# Patient Record
Sex: FEMALE | Race: WHITE | NOT HISPANIC OR LATINO | Employment: FULL TIME | URBAN - METROPOLITAN AREA
[De-identification: names, ages, dates, MRNs, and addresses within clinical notes are randomized per-mention and may not be internally consistent; named-entity substitution may affect disease eponyms.]

---

## 2019-10-18 ENCOUNTER — OFFICE VISIT (OUTPATIENT)
Dept: FAMILY MEDICINE CLINIC | Facility: CLINIC | Age: 64
End: 2019-10-18

## 2019-10-18 VITALS
HEART RATE: 90 BPM | DIASTOLIC BLOOD PRESSURE: 70 MMHG | TEMPERATURE: 97.8 F | BODY MASS INDEX: 21.52 KG/M2 | SYSTOLIC BLOOD PRESSURE: 120 MMHG | OXYGEN SATURATION: 98 % | RESPIRATION RATE: 16 BRPM | HEIGHT: 61 IN | WEIGHT: 114 LBS

## 2019-10-18 DIAGNOSIS — H61.23 BILATERAL IMPACTED CERUMEN: ICD-10-CM

## 2019-10-18 DIAGNOSIS — Z12.39 BREAST CANCER SCREENING: ICD-10-CM

## 2019-10-18 DIAGNOSIS — Z11.59 ENCOUNTER FOR HEPATITIS C SCREENING TEST FOR LOW RISK PATIENT: ICD-10-CM

## 2019-10-18 DIAGNOSIS — E78.2 MIXED HYPERLIPIDEMIA: ICD-10-CM

## 2019-10-18 DIAGNOSIS — Z12.11 SCREENING FOR COLON CANCER: ICD-10-CM

## 2019-10-18 DIAGNOSIS — Z00.00 ANNUAL PHYSICAL EXAM: Primary | ICD-10-CM

## 2019-10-18 DIAGNOSIS — E55.9 VITAMIN D DEFICIENCY: ICD-10-CM

## 2019-10-18 DIAGNOSIS — R42 DIZZINESS: ICD-10-CM

## 2019-10-18 DIAGNOSIS — I73.00 RAYNAUD'S DISEASE WITHOUT GANGRENE: ICD-10-CM

## 2019-10-18 DIAGNOSIS — Z23 IMMUNIZATION DUE: ICD-10-CM

## 2019-10-18 PROCEDURE — 90471 IMMUNIZATION ADMIN: CPT | Performed by: NURSE PRACTITIONER

## 2019-10-18 PROCEDURE — 99397 PER PM REEVAL EST PAT 65+ YR: CPT | Performed by: NURSE PRACTITIONER

## 2019-10-18 PROCEDURE — 90715 TDAP VACCINE 7 YRS/> IM: CPT | Performed by: NURSE PRACTITIONER

## 2019-10-18 RX ORDER — MECLIZINE HYDROCHLORIDE 25 MG/1
12.5 TABLET ORAL 3 TIMES DAILY PRN
Qty: 30 TABLET | Refills: 0 | Status: SHIPPED | OUTPATIENT
Start: 2019-10-18 | End: 2022-05-31

## 2019-10-18 RX ORDER — FLUTICASONE PROPIONATE 50 MCG
2 SPRAY, SUSPENSION (ML) NASAL DAILY
COMMUNITY
Start: 2019-10-18 | End: 2022-05-31

## 2019-10-18 RX ORDER — AMLODIPINE BESYLATE 5 MG/1
5 TABLET ORAL DAILY
Qty: 30 TABLET | Refills: 2 | Status: SHIPPED | OUTPATIENT
Start: 2019-10-18 | End: 2022-05-31

## 2019-10-18 NOTE — PROGRESS NOTES
Patient ID: Danii Emanuel is a 64 y.o. female     Subjective     Chief Complaint   Patient presents with   • Establish Care   • Dizziness       History of Present Illness    Danii Emanuel presents to the office today for new patient evaluation for annual physical exam.  It has been a while since she has been seen by primary care provider.  She was previously seen by Dr. Jaun Garcia.  She has been doing well except for new onset of dizziness which started approximately 2 weeks ago.  Occurs with change of position.  Especially when laying down.  Also dizziness worsens when sitting up.   She has not been taking any medications on a regular basis.  She does have history of Raynaud's disease and previously was taking amlodipine 5 mg daily which helped control her symptoms.  She stopped taking a couple years ago due to not being seen by primary care.  Continues to have coldness in her hands bilaterally.  She has difficulty grasping objects at times when her fingers are cold.  Has decreased feeling in fingers during those times.  She also had previously taken a baby aspirin which she has continue taking on a daily basis and this helps with her Raynaud's also.  She is a current everyday smoker of approximately half a pack a day for the past 50 years.  She has no desire to quit smoking at this time.  Prior history of hypertension in which she has not been taking any medications at this time.  She also had low vitamin D levels in the past.  She has not had colonoscopy completed yet.  She is also past due for mammogram.  Most recent completed in June 2013 which was negative.    Most recent DEXA scan completed and June 2007 that revealed lumbar spine T score of -0.9, left hip T score -0.8.  Normal study.  She wishes to hold off on repeating DEXA scan at this time.  She underwent Doppler ultrasound of carotid arteries in May 1998 which was normal.  She had previously been started on treatment for hyperlipidemia and 2009  however no longer takes medication for this.  She is fasting today.  She is past due for Pap.  Previous one completed years ago was normal.    She denies any complaints of fever, chills, cough, chest pain, shortness of air, abdominal pain, nausea, or any other concerns.     The following portions of the patient's history were reviewed and updated as appropriate: allergies, current medications, past family history, past medical history, past social history, past surgical history and problem list.       Review of Systems   Constitution: Negative.   HENT: Negative.    Eyes: Negative.    Cardiovascular: Negative.    Respiratory: Negative.    Endocrine: Negative.    Hematologic/Lymphatic: Negative.    Skin: Negative.    Musculoskeletal: Negative.    Gastrointestinal: Negative.    Genitourinary: Negative.    Neurological: Positive for dizziness.   Psychiatric/Behavioral: Negative.        Vitals:    10/18/19 0825   BP: 120/70   Pulse: 90   Resp: 16   Temp: 97.8 °F (36.6 °C)   SpO2: 98%       Documented weights    10/18/19 0825   Weight: 51.7 kg (114 lb)     Body mass index is 21.54 kg/m².    Results for orders placed or performed in visit on 06/28/16    DEXA SCAN   Result Value Ref Range     Dexa Scan       Ordering MD - Dr. Jaun Garcia - [facility] - Normal bone density.       Objective       Physical Exam   Constitutional: She is oriented to person, place, and time. Vital signs are normal. She appears well-developed.   HENT:   Head: Normocephalic and atraumatic.   Mouth/Throat: Oropharynx is clear and moist.   Bilateral cerumen impaction    Eyes: EOM are normal. Pupils are equal, round, and reactive to light.   Neck: Normal range of motion. Neck supple.   Cardiovascular: Normal rate, regular rhythm, normal heart sounds and intact distal pulses.   No murmur heard.  Pulmonary/Chest: Effort normal and breath sounds normal. She has no wheezes. She has no rhonchi. She has no rales.   Abdominal: Soft. Bowel sounds are  normal. There is no hepatosplenomegaly. There is no tenderness.   Musculoskeletal: Normal range of motion. She exhibits no edema or tenderness.   Neurological: She is alert and oriented to person, place, and time. She has normal strength.   Noted dizziness with laying down and sitting up.  No nystagmus noted.     Skin: Skin is warm and dry. No rash noted. No cyanosis or erythema.   Psychiatric: She has a normal mood and affect. Her behavior is normal.     Ear Cerumen Removal  Date/Time: 10/21/2019 12:08 PM  Performed by: Sharlene Yu APRN  Authorized by: Sharlene Yu APRN   Consent: Verbal consent obtained.  Risks and benefits: risks, benefits and alternatives were discussed  Consent given by: patient  Patient understanding: patient states understanding of the procedure being performed  Patient identity confirmed: verbally with patient    Anesthesia:  Local Anesthetic: none  Location details: left ear and right ear  Patient tolerance: Patient tolerated the procedure well with no immediate complications  Comments: Moderate amount removed from both ears.  Able to visualize TM which was bulging right ear.  No redness.    Procedure type: irrigation   Sedation:  Patient sedated: no           Assessment/Plan     Assessment/Plan     Danii was seen today for establish care and dizziness.    Diagnoses and all orders for this visit:    Annual physical exam  -     CBC & Differential  -     Comprehensive Metabolic Panel  -     Lipid Panel  -     TSH  -     Vitamin D 25 Hydroxy    Raynaud's disease without gangrene  -     amLODIPine (NORVASC) 5 MG tablet; Take 1 tablet by mouth Daily.  -     CBC & Differential  -     Comprehensive Metabolic Panel    Vitamin D deficiency  -     Vitamin D 25 Hydroxy    Mixed hyperlipidemia  -     Comprehensive Metabolic Panel  -     Lipid Panel    Encounter for hepatitis C screening test for low risk patient  -     Hepatitis C Antibody    Breast cancer screening  -     Mammo Screening Bilateral  With CAD; Future    Screening for colon cancer  -     Ambulatory Referral For Screening Colonoscopy    Immunization due  -     Tdap Vaccine Greater Than or Equal To 6yo IM    Bilateral impacted cerumen  -     Ear Cerumen Removal    Dizziness    Other orders  -     fluticasone (FLONASE) 50 MCG/ACT nasal spray; 2 sprays into the nostril(s) as directed by provider Daily.  -     meclizine (ANTIVERT) 25 MG tablet; Take 0.5 tablets by mouth 3 (Three) Times a Day As Needed for dizziness.      Summary:  Danii Emanuel presents office today as a new patient evaluation for annual physical exam.  It has been many years since she was last seen.  She primarily presented to office today due to 2-week course of dizziness.  She did have cerumen impaction and required ear irrigation.  Her symptoms of dizziness did not improve after this procedure.  Instructed to use Flonase nasal spray 2 sprays each nostril daily.  Also recommended physical therapy for Epley maneuvers however she does not wish to proceed with this at this time.  She wishes to do exercises on her own at home.  I provided her written information concerning the Epley maneuvers to be completed at home and instructed to attempt to have completed at least 3 times a day.  If her symptoms do not improve, believe the next step would be physical therapy.  She is fasting today, we will obtain routine labs today and call her with results.  Also instructed to resume taking amlodipine as previously directed to 5 mg daily.  Continue enteric-coated baby aspirin daily.  Also gave her prescription to Antivert to take as needed for dizziness.  Depending on lab results will also determine further recommendations.  I also offered to have Pap completed in office today however she wishes to hold off for now.  She plans to schedule when she is able to come in.  Ordered mammogram and referral for screening colonoscopy.    In the meantime, instructed to contact us sooner for any problems or  concerns.    Sharlene LEWIS Head, APRN  Family Medicine  [facility] [place]  10/18/19  8:40 AM

## 2019-10-19 LAB
25(OH)D3+25(OH)D2 SERPL-MCNC: 25.4 NG/ML (ref 30–100)
ALBUMIN SERPL-MCNC: 4.7 G/DL (ref 3.5–5.2)
ALBUMIN/GLOB SERPL: 1.9 G/DL
ALP SERPL-CCNC: 41 U/L (ref 39–117)
ALT SERPL-CCNC: 11 U/L (ref 1–33)
AST SERPL-CCNC: 16 U/L (ref 1–32)
BASOPHILS # BLD AUTO: 0.03 10*3/MM3 (ref 0–0.2)
BASOPHILS NFR BLD AUTO: 0.3 % (ref 0–1.5)
BILIRUB SERPL-MCNC: 0.4 MG/DL (ref 0.2–1.2)
BUN SERPL-MCNC: 8 MG/DL (ref 8–23)
BUN/CREAT SERPL: 12.5 (ref 7–25)
CALCIUM SERPL-MCNC: 10.1 MG/DL (ref 8.6–10.5)
CHLORIDE SERPL-SCNC: 101 MMOL/L (ref 98–107)
CHOLEST SERPL-MCNC: 245 MG/DL (ref 0–200)
CO2 SERPL-SCNC: 27.7 MMOL/L (ref 22–29)
CREAT SERPL-MCNC: 0.64 MG/DL (ref 0.57–1)
EOSINOPHIL # BLD AUTO: 0.08 10*3/MM3 (ref 0–0.4)
EOSINOPHIL NFR BLD AUTO: 0.7 % (ref 0.3–6.2)
ERYTHROCYTE [DISTWIDTH] IN BLOOD BY AUTOMATED COUNT: 12.2 % (ref 12.3–15.4)
GLOBULIN SER CALC-MCNC: 2.5 GM/DL
GLUCOSE SERPL-MCNC: 86 MG/DL (ref 65–99)
HCT VFR BLD AUTO: 41.9 % (ref 34–46.6)
HCV AB S/CO SERPL IA: 0.1 S/CO RATIO (ref 0–0.9)
HDLC SERPL-MCNC: 78 MG/DL (ref 40–60)
HGB BLD-MCNC: 14 G/DL (ref 12–15.9)
IMM GRANULOCYTES # BLD AUTO: 0.04 10*3/MM3 (ref 0–0.05)
IMM GRANULOCYTES NFR BLD AUTO: 0.3 % (ref 0–0.5)
LDLC SERPL CALC-MCNC: 147 MG/DL (ref 0–100)
LYMPHOCYTES # BLD AUTO: 2.2 10*3/MM3 (ref 0.7–3.1)
LYMPHOCYTES NFR BLD AUTO: 19.1 % (ref 19.6–45.3)
MCH RBC QN AUTO: 32.3 PG (ref 26.6–33)
MCHC RBC AUTO-ENTMCNC: 33.4 G/DL (ref 31.5–35.7)
MCV RBC AUTO: 96.5 FL (ref 79–97)
MONOCYTES # BLD AUTO: 0.88 10*3/MM3 (ref 0.1–0.9)
MONOCYTES NFR BLD AUTO: 7.6 % (ref 5–12)
NEUTROPHILS # BLD AUTO: 8.28 10*3/MM3 (ref 1.7–7)
NEUTROPHILS NFR BLD AUTO: 72 % (ref 42.7–76)
NRBC BLD AUTO-RTO: 0 /100 WBC (ref 0–0.2)
PLATELET # BLD AUTO: 380 10*3/MM3 (ref 140–450)
POTASSIUM SERPL-SCNC: 4.4 MMOL/L (ref 3.5–5.2)
PROT SERPL-MCNC: 7.2 G/DL (ref 6–8.5)
RBC # BLD AUTO: 4.34 10*6/MM3 (ref 3.77–5.28)
SODIUM SERPL-SCNC: 141 MMOL/L (ref 136–145)
TRIGL SERPL-MCNC: 101 MG/DL (ref 0–150)
TSH SERPL DL<=0.005 MIU/L-ACNC: 0.72 UIU/ML (ref 0.27–4.2)
VLDLC SERPL CALC-MCNC: 20.2 MG/DL
WBC # BLD AUTO: 11.51 10*3/MM3 (ref 3.4–10.8)

## 2019-10-21 PROCEDURE — 69209 REMOVE IMPACTED EAR WAX UNI: CPT | Performed by: NURSE PRACTITIONER

## 2019-10-22 ENCOUNTER — TELEPHONE (OUTPATIENT)
Dept: FAMILY MEDICINE CLINIC | Facility: CLINIC | Age: 64
End: 2019-10-22

## 2019-10-22 NOTE — TELEPHONE ENCOUNTER
Pt would like to continue the exercises Sharlene gave at her appt, as her  is having surgery guillermo, and she would rather not have multiple appts for herself and her .  Instructed pt to continue the exercises, and notify us in 2-3 weeks if there is no improvement, or sooner if she would like a referral.

## 2019-10-22 NOTE — TELEPHONE ENCOUNTER
Upon contacting pt about her lab results, she mentioned she is having trouble with vertigo.     Pt stated she had a bilat ear lavage when seen last week, as Sharlene thought this would resolve her dizziness, but it didn't help.     Pt would like to know what else she can do for this?  Please advise.

## 2019-11-19 DIAGNOSIS — Z11.1 SCREENING-PULMONARY TB: Primary | ICD-10-CM

## 2019-11-24 ENCOUNTER — RESULTS ENCOUNTER (OUTPATIENT)
Dept: FAMILY MEDICINE CLINIC | Facility: CLINIC | Age: 64
End: 2019-11-24

## 2019-11-24 DIAGNOSIS — Z11.1 SCREENING-PULMONARY TB: ICD-10-CM

## 2019-11-25 ENCOUNTER — APPOINTMENT (OUTPATIENT)
Dept: LAB | Facility: HOSPITAL | Age: 64
End: 2019-11-25

## 2019-11-25 PROCEDURE — 36415 COLL VENOUS BLD VENIPUNCTURE: CPT | Performed by: NURSE PRACTITIONER

## 2019-11-25 PROCEDURE — 86480 TB TEST CELL IMMUN MEASURE: CPT | Performed by: NURSE PRACTITIONER

## 2019-11-27 LAB
QUANTIFERON CRITERIA: NORMAL
QUANTIFERON INCUBATION: NORMAL
QUANTIFERON MITOGEN VALUE: >10 IU/ML
QUANTIFERON NIL VALUE: 0.01 IU/ML
QUANTIFERON TB1 AG VALUE: 0.02 IU/ML
QUANTIFERON TB2 AG VALUE: 0.01 IU/ML
QUANTIFERON-TB GOLD PLUS: NEGATIVE

## 2021-02-19 ENCOUNTER — IMMUNIZATION (OUTPATIENT)
Dept: VACCINE CLINIC | Facility: HOSPITAL | Age: 66
End: 2021-02-19

## 2021-02-19 PROCEDURE — 0001A: CPT | Performed by: INTERNAL MEDICINE

## 2021-02-19 PROCEDURE — 91300 HC SARSCOV02 VAC 30MCG/0.3ML IM: CPT | Performed by: INTERNAL MEDICINE

## 2021-03-12 ENCOUNTER — IMMUNIZATION (OUTPATIENT)
Dept: VACCINE CLINIC | Facility: HOSPITAL | Age: 66
End: 2021-03-12

## 2021-03-12 PROCEDURE — 0002A: CPT | Performed by: INTERNAL MEDICINE

## 2021-03-12 PROCEDURE — 91300 HC SARSCOV02 VAC 30MCG/0.3ML IM: CPT | Performed by: INTERNAL MEDICINE

## 2021-04-12 ENCOUNTER — OFFICE VISIT (OUTPATIENT)
Dept: FAMILY MEDICINE CLINIC | Facility: CLINIC | Age: 66
End: 2021-04-12

## 2021-04-12 VITALS
HEIGHT: 61 IN | WEIGHT: 108 LBS | TEMPERATURE: 97.1 F | RESPIRATION RATE: 16 BRPM | OXYGEN SATURATION: 99 % | HEART RATE: 97 BPM | DIASTOLIC BLOOD PRESSURE: 80 MMHG | BODY MASS INDEX: 20.39 KG/M2 | SYSTOLIC BLOOD PRESSURE: 120 MMHG

## 2021-04-12 DIAGNOSIS — H61.23 BILATERAL IMPACTED CERUMEN: Primary | ICD-10-CM

## 2021-04-12 DIAGNOSIS — R21 RASH: ICD-10-CM

## 2021-04-12 DIAGNOSIS — R42 DIZZINESS: ICD-10-CM

## 2021-04-12 PROCEDURE — 69209 REMOVE IMPACTED EAR WAX UNI: CPT | Performed by: NURSE PRACTITIONER

## 2021-04-12 PROCEDURE — 99213 OFFICE O/P EST LOW 20 MIN: CPT | Performed by: NURSE PRACTITIONER

## 2021-04-12 NOTE — PATIENT INSTRUCTIONS
Dizziness  Dizziness is a common problem. It makes you feel unsteady or light-headed. You may feel like you are about to pass out (faint). Dizziness can lead to getting hurt if you stumble or fall. Dizziness can be caused by many things, including:  · Medicines.  · Not having enough water in your body (dehydration).  · Illness.  Follow these instructions at home:  Eating and drinking    · Drink enough fluid to keep your pee (urine) clear or pale yellow. This helps to keep you from getting dehydrated. Try to drink more clear fluids, such as water.  · Do not drink alcohol.  · Limit how much caffeine you drink or eat, if your doctor tells you to do that.  · Limit how much salt (sodium) you drink or eat, if your doctor tells you to do that.  Activity    · Avoid making quick movements.  ? When you stand up from sitting in a chair, steady yourself until you feel okay.  ? In the morning, first sit up on the side of the bed. When you feel okay, stand slowly while you hold onto something. Do this until you know that your balance is fine.  · If you need to  one place for a long time, move your legs often. Tighten and relax the muscles in your legs while you are standing.  · Do not drive or use heavy machinery if you feel dizzy.  · Avoid bending down if you feel dizzy. Place items in your home so you can reach them easily without leaning over.  Lifestyle  · Do not use any products that contain nicotine or tobacco, such as cigarettes and e-cigarettes. If you need help quitting, ask your doctor.  · Try to lower your stress level. You can do this by using methods such as yoga or meditation. Talk with your doctor if you need help.  General instructions  · Watch your dizziness for any changes.  · Take over-the-counter and prescription medicines only as told by your doctor. Talk with your doctor if you think that you are dizzy because of a medicine that you are taking.  · Tell a friend or a family member that you are feeling  dizzy. If he or she notices any changes in your behavior, have this person call your doctor.  · Keep all follow-up visits as told by your doctor. This is important.  Contact a doctor if:  · Your dizziness does not go away.  · Your dizziness or light-headedness gets worse.  · You feel sick to your stomach (nauseous).  · You have trouble hearing.  · You have new symptoms.  · You are unsteady on your feet.  · You feel like the room is spinning.  Get help right away if:  · You throw up (vomit) or have watery poop (diarrhea), and you cannot eat or drink anything.  · You have trouble:  ? Talking.  ? Walking.  ? Swallowing.  ? Using your arms, hands, or legs.  · You feel generally weak.  · You are not thinking clearly, or you have trouble forming sentences. A friend or family member may notice this.  · You have:  ? Chest pain.  ? Pain in your belly (abdomen).  ? Shortness of breath.  ? Sweating.  · Your vision changes.  · You are bleeding.  · You have a very bad headache.  · You have neck pain or a stiff neck.  · You have a fever.  These symptoms may be an emergency. Do not wait to see if the symptoms will go away. Get medical help right away. Call your local emergency services (911 in the [place] Do not drive yourself to the hospital.  Summary  · Dizziness makes you feel unsteady or light-headed. You may feel like you are about to pass out (faint).  · Drink enough fluid to keep your pee (urine) clear or pale yellow. Do not drink alcohol.  · Avoid making quick movements if you feel dizzy.  · Watch your dizziness for any changes.  This information is not intended to replace advice given to you by your health care provider. Make sure you discuss any questions you have with your health care provider.  Document Revised: 12/21/2018 Document Reviewed: 01/04/2018  Elsevier Patient Education © 2021 [employer]

## 2021-04-12 NOTE — PROGRESS NOTES
Patient ID: Danii Emanuel is a 65 y.o. female     Patient Care Team:  Head, PEGGY Correa as PCP - General (Nurse Practitioner)    Subjective     Chief Complaint   Patient presents with   • Dizziness       Danii Emanuel presents to [facility] today for problems with dizziness.  Onset over the past week or so.  Had previous symptoms which were related to impacted cerumen in bilateral ears.  Current symptoms feel similar to previous.  However left ear is worse than the right..     Dizziness  This is a new problem. The current episode started 1 to 4 weeks ago. The problem occurs daily. The problem has been gradually worsening. Pertinent negatives include no congestion, coughing, fatigue, fever or sore throat.       She denies any complaints of fever, chills, cough, chest pain, shortness of air, abdominal pain, nausea, or any other concerns.     The following portions of the patient's history were reviewed and updated as appropriate: allergies, current medications, past family history, past medical history, past social history, past surgical history and problem list.       Review of Systems   Constitutional: Negative for fatigue and fever.   HENT: Negative for congestion and sore throat.    Respiratory: Negative for cough.    Neurological: Positive for dizziness.       Vitals:    04/12/21 0732   BP: 120/80   Pulse: 97   Resp: 16   Temp: 97.1 °F (36.2 °C)   SpO2: 99%       Documented weights    04/12/21 0732   Weight: 49 kg (108 lb)     Body mass index is 20.41 kg/m².    Results for orders placed or performed in visit on 11/24/19   QuantiFERON TB Gold    Specimen: Blood   Result Value Ref Range    QuantiFERON Incubation Incubation performed.     QuantiFERON Criteria Comment     QUANTIFERON TB1 AG VALUE 0.02 IU/mL    QUANTIFERON TB2 AG VALUE 0.01 IU/mL    QuantiFERON Nil Value 0.01 IU/mL    QuantiFERON Mitogen Value >10.00 IU/mL    QUANTIFERON-TB GOLD PLUS Negative Negative            Objective       Physical Exam  Vitals reviewed.   Constitutional:       General: She is not in acute distress.  HENT:      Head: Normocephalic and atraumatic.      Right Ear: There is impacted cerumen.      Left Ear: There is impacted cerumen.   Eyes:      Extraocular Movements: Extraocular movements intact.      Pupils: Pupils are equal, round, and reactive to light.   Cardiovascular:      Rate and Rhythm: Normal rate and regular rhythm.      Heart sounds: No murmur heard.     Pulmonary:      Effort: Pulmonary effort is normal.      Breath sounds: Normal breath sounds. No wheezing.   Musculoskeletal:      Cervical back: Normal range of motion.      Right lower leg: No edema.      Left lower leg: No edema.   Neurological:      Mental Status: She is alert and oriented to person, place, and time.      Cranial Nerves: No cranial nerve deficit.   Psychiatric:         Mood and Affect: Mood normal.       Ear Cerumen Removal    Date/Time: 4/12/2021 6:40 PM  Performed by: Sharlene Yu APRN  Authorized by: Sharlene Yu APRN     Anesthesia:  Local Anesthetic: none  Location details: left ear and right ear  Patient tolerance: patient tolerated the procedure well with no immediate complications  Comments: Moderate amount irrigated from left and small amount from right.  TM visualized bilaterally and are normal,    Procedure type: irrigation   Sedation:  Patient sedated: no             Assessment/Plan     Assessment/Plan     Diagnoses and all orders for this visit:    1. Bilateral impacted cerumen (Primary)  -     Ear Cerumen Removal    2. Rash  -     triamcinolone (KENALOG) 0.1 % ointment; Apply  topically to the appropriate area as directed 2 (Two) Times a Day.  Dispense: 15 g; Refill: 0    3. Dizziness          Summary:  Danii Emanuel presented with problems with dizziness.  Likely her symptoms related to earwax buildup.  Ears were irrigated in office today.  Instructed to let us know if her symptoms continue or do  not continue to improve.  Also strongly advised for her to schedule for annual physical exam with fasting labs.  She also complained of a rash area to right forearm that has been there about a week.  Complains of itching at site.  Has been using over-the-counter hydrocortisone without relief.  Instructed to apply treatments along ointment to area twice a day until healed.  She is to let us know if does not improve with these measures.    In the meantime, instructed to contact us sooner for any problems or concerns.    Follow Up:  Return in about 4 weeks (around 5/10/2021) for Annual.    Patient was given instructions and counseling regarding condition or for health maintenance advice.  Please see specific information pulled into the AVS if appropriate.      Patient was wearing facemask when I entered the room and throughout our encounter. Protective equipment was worn throughout this patient encounter including a face mask, eye protection,  and gloves. Hand hygiene was performed before donning protective equipment and after removal when leaving the room.     Sharlene Yu, APRN  Family Medicine  [facility] [place]  04/12/21  18:40 EDT

## 2021-08-19 ENCOUNTER — OFFICE VISIT (OUTPATIENT)
Dept: FAMILY MEDICINE CLINIC | Facility: CLINIC | Age: 66
End: 2021-08-19

## 2021-08-19 VITALS
BODY MASS INDEX: 20.2 KG/M2 | OXYGEN SATURATION: 98 % | DIASTOLIC BLOOD PRESSURE: 70 MMHG | WEIGHT: 107 LBS | SYSTOLIC BLOOD PRESSURE: 132 MMHG | TEMPERATURE: 98.6 F | HEART RATE: 98 BPM | RESPIRATION RATE: 16 BRPM | HEIGHT: 61 IN

## 2021-08-19 DIAGNOSIS — R31.9 HEMATURIA, UNSPECIFIED TYPE: Primary | ICD-10-CM

## 2021-08-19 DIAGNOSIS — E78.2 MIXED HYPERLIPIDEMIA: ICD-10-CM

## 2021-08-19 DIAGNOSIS — Z13.29 SCREENING FOR THYROID DISORDER: ICD-10-CM

## 2021-08-19 LAB
BILIRUB BLD-MCNC: NEGATIVE MG/DL
CLARITY, POC: ABNORMAL
COLOR UR: YELLOW
GLUCOSE UR STRIP-MCNC: NEGATIVE MG/DL
KETONES UR QL: NEGATIVE
LEUKOCYTE EST, POC: NEGATIVE
NITRITE UR-MCNC: NEGATIVE MG/ML
PH UR: 6 [PH] (ref 5–8)
PROT UR STRIP-MCNC: NEGATIVE MG/DL
RBC # UR STRIP: ABNORMAL /UL
SP GR UR: 1.03 (ref 1–1.03)
UROBILINOGEN UR QL: NORMAL

## 2021-08-19 PROCEDURE — 81002 URINALYSIS NONAUTO W/O SCOPE: CPT | Performed by: NURSE PRACTITIONER

## 2021-08-19 PROCEDURE — 99213 OFFICE O/P EST LOW 20 MIN: CPT | Performed by: NURSE PRACTITIONER

## 2021-08-19 RX ORDER — NITROFURANTOIN 25; 75 MG/1; MG/1
100 CAPSULE ORAL EVERY 12 HOURS SCHEDULED
Qty: 14 CAPSULE | Refills: 0 | Status: SHIPPED | OUTPATIENT
Start: 2021-08-19 | End: 2021-08-26

## 2021-08-19 NOTE — PROGRESS NOTES
"Chief Complaint   Patient presents with   • Blood in Urine       Urinary Tract Infection: Patient complains of hematuria and dark urine She has had symptoms for 1 day. Patient also complains of none. Patient denies back pain and fever. Patient does not have a history of recurrent UTI.  Patient does not have a history of pyelonephritis.   Has been hospitalized due to sepsis related to kidney infection in the past.      The following portions of the patient's history were reviewed and updated as appropriate: allergies, current medications, past family history, past medical history, past social history, past surgical history and problem list.    Review of Systems    Vitals:    08/19/21 1441   BP: 132/70   BP Location: Left arm   Patient Position: Sitting   Cuff Size: Adult   Pulse: 98   Resp: 16   Temp: 98.6 °F (37 °C)   SpO2: 98%   Weight: 48.5 kg (107 lb)   Height: 154.9 cm (61\")     Gen: Well appearing, alert  HEENT: WNL  Lung: Regular RR, no audible wheeze  Heart: RR without murmur  Skin: No rash, W/D  Abd: Non tender, non distended, positive bowel sounds  Flank: No CVA, no rash    In office urine dipstick results:  Brief Urine Lab Results  (Last result in the past 365 days)      Color   Clarity   Blood   Leuk Est   Nitrite   Protein   CREAT   Urine HCG        08/19/21 1453 Yellow Hazy Large Negative Negative Negative                   Assessment/Plan   Diagnoses and all orders for this visit:    1. Hematuria, unspecified type (Primary)  -     POCT urinalysis dipstick, manual  -     CBC & Differential  -     Comprehensive Metabolic Panel  -     Urine Culture - Urine, Urine, Clean Catch    2. Mixed hyperlipidemia  -     CBC & Differential  -     Comprehensive Metabolic Panel  -     Lipid Panel    3. Screening for thyroid disorder  -     TSH    Other orders  -     nitrofurantoin, macrocrystal-monohydrate, (Macrobid) 100 MG capsule; Take 1 capsule by mouth Every 12 (Twelve) Hours for 7 days.  Dispense: 14 capsule; " Refill: 0         Start Macrobid.  Awaiting culture.  If negative, need referral to urology.      Tylenol or Advil as needed for pain, fever  Plenty of fluids  OTC Azo ok  Off work or school note given if needed.  Pros and cons of antibiotic use discussed    Sharlene Yu, APRN  Family Practice  [facility] [place]

## 2021-08-20 LAB
ALBUMIN SERPL-MCNC: 4.1 G/DL (ref 3.5–5.2)
ALBUMIN/GLOB SERPL: 1.6 G/DL
ALP SERPL-CCNC: 42 U/L (ref 39–117)
ALT SERPL-CCNC: 8 U/L (ref 1–33)
AST SERPL-CCNC: 20 U/L (ref 1–32)
BASOPHILS # BLD AUTO: 0.03 10*3/MM3 (ref 0–0.2)
BASOPHILS NFR BLD AUTO: 0.4 % (ref 0–1.5)
BILIRUB SERPL-MCNC: 0.2 MG/DL (ref 0–1.2)
BUN SERPL-MCNC: 12 MG/DL (ref 8–23)
BUN/CREAT SERPL: 18.8 (ref 7–25)
CALCIUM SERPL-MCNC: 10.1 MG/DL (ref 8.6–10.5)
CHLORIDE SERPL-SCNC: 101 MMOL/L (ref 98–107)
CHOLEST SERPL-MCNC: 218 MG/DL (ref 0–200)
CO2 SERPL-SCNC: 27.8 MMOL/L (ref 22–29)
CREAT SERPL-MCNC: 0.64 MG/DL (ref 0.57–1)
EOSINOPHIL # BLD AUTO: 0.03 10*3/MM3 (ref 0–0.4)
EOSINOPHIL NFR BLD AUTO: 0.4 % (ref 0.3–6.2)
ERYTHROCYTE [DISTWIDTH] IN BLOOD BY AUTOMATED COUNT: 12.2 % (ref 12.3–15.4)
GLOBULIN SER CALC-MCNC: 2.5 GM/DL
GLUCOSE SERPL-MCNC: 85 MG/DL (ref 65–99)
HCT VFR BLD AUTO: 41.6 % (ref 34–46.6)
HDLC SERPL-MCNC: 76 MG/DL (ref 40–60)
HGB BLD-MCNC: 14.1 G/DL (ref 12–15.9)
IMM GRANULOCYTES # BLD AUTO: 0.02 10*3/MM3 (ref 0–0.05)
IMM GRANULOCYTES NFR BLD AUTO: 0.3 % (ref 0–0.5)
LDLC SERPL CALC-MCNC: 122 MG/DL (ref 0–100)
LYMPHOCYTES # BLD AUTO: 2.18 10*3/MM3 (ref 0.7–3.1)
LYMPHOCYTES NFR BLD AUTO: 31.7 % (ref 19.6–45.3)
MCH RBC QN AUTO: 31.8 PG (ref 26.6–33)
MCHC RBC AUTO-ENTMCNC: 33.9 G/DL (ref 31.5–35.7)
MCV RBC AUTO: 93.9 FL (ref 79–97)
MONOCYTES # BLD AUTO: 0.66 10*3/MM3 (ref 0.1–0.9)
MONOCYTES NFR BLD AUTO: 9.6 % (ref 5–12)
NEUTROPHILS # BLD AUTO: 3.95 10*3/MM3 (ref 1.7–7)
NEUTROPHILS NFR BLD AUTO: 57.6 % (ref 42.7–76)
NRBC BLD AUTO-RTO: 0 /100 WBC (ref 0–0.2)
PLATELET # BLD AUTO: 317 10*3/MM3 (ref 140–450)
POTASSIUM SERPL-SCNC: 4.5 MMOL/L (ref 3.5–5.2)
PROT SERPL-MCNC: 6.6 G/DL (ref 6–8.5)
RBC # BLD AUTO: 4.43 10*6/MM3 (ref 3.77–5.28)
SODIUM SERPL-SCNC: 139 MMOL/L (ref 136–145)
TRIGL SERPL-MCNC: 117 MG/DL (ref 0–150)
TSH SERPL DL<=0.005 MIU/L-ACNC: 0.95 UIU/ML (ref 0.27–4.2)
VLDLC SERPL CALC-MCNC: 20 MG/DL (ref 5–40)
WBC # BLD AUTO: 6.87 10*3/MM3 (ref 3.4–10.8)

## 2021-08-21 LAB
BACTERIA UR CULT: ABNORMAL
BACTERIA UR CULT: ABNORMAL

## 2021-08-23 RX ORDER — AMOXICILLIN AND CLAVULANATE POTASSIUM 875; 125 MG/1; MG/1
1 TABLET, FILM COATED ORAL EVERY 12 HOURS SCHEDULED
Qty: 20 TABLET | Refills: 0 | Status: SHIPPED | OUTPATIENT
Start: 2021-08-23 | End: 2021-09-02

## 2021-08-23 NOTE — PROGRESS NOTES
Notify Danii Emanuel her urine culture shows she has a UTI however the antibiotic she was given will not cover her infection.  Needs to stop Macrobid and start Augmentin twice a day for 10 days.  Let us know if symptoms worsen or do not improve.

## 2021-08-26 ENCOUNTER — TELEPHONE (OUTPATIENT)
Dept: FAMILY MEDICINE CLINIC | Facility: CLINIC | Age: 66
End: 2021-08-26

## 2021-08-26 NOTE — TELEPHONE ENCOUNTER
Caller: Danii Emanuel    Relationship to patient: Self    Best call back number: [phone]    Patient is needing: ATTEMPTED TO WARM TRANSFER. PATIENT STATES SOMEONE CALLED HER FROM OFFICE BUT DID NOT LEAVE A VOICEMAIL

## 2022-05-31 ENCOUNTER — OFFICE VISIT (OUTPATIENT)
Dept: FAMILY MEDICINE CLINIC | Facility: CLINIC | Age: 67
End: 2022-05-31

## 2022-05-31 ENCOUNTER — HOSPITAL ENCOUNTER (OUTPATIENT)
Dept: GENERAL RADIOLOGY | Facility: HOSPITAL | Age: 67
Discharge: HOME | End: 2022-05-31
Admitting: NURSE PRACTITIONER

## 2022-05-31 VITALS
BODY MASS INDEX: 19.07 KG/M2 | OXYGEN SATURATION: 100 % | HEIGHT: 61 IN | TEMPERATURE: 98.6 F | DIASTOLIC BLOOD PRESSURE: 72 MMHG | SYSTOLIC BLOOD PRESSURE: 120 MMHG | HEART RATE: 102 BPM | RESPIRATION RATE: 16 BRPM | WEIGHT: 101 LBS

## 2022-05-31 DIAGNOSIS — R31.9 HEMATURIA, UNSPECIFIED TYPE: Primary | ICD-10-CM

## 2022-05-31 LAB
BILIRUB BLD-MCNC: NEGATIVE MG/DL
CLARITY, POC: ABNORMAL
COLOR UR: ABNORMAL
GLUCOSE UR STRIP-MCNC: NEGATIVE MG/DL
KETONES UR QL: NEGATIVE
LEUKOCYTE EST, POC: NEGATIVE
NITRITE UR-MCNC: NEGATIVE MG/ML
PH UR: 5 [PH] (ref 5–8)
PROT UR STRIP-MCNC: ABNORMAL MG/DL
RBC # UR STRIP: ABNORMAL /UL
SP GR UR: 1.03 (ref 1–1.03)
UROBILINOGEN UR QL: NORMAL

## 2022-05-31 PROCEDURE — 99213 OFFICE O/P EST LOW 20 MIN: CPT | Performed by: NURSE PRACTITIONER

## 2022-05-31 PROCEDURE — 74018 RADEX ABDOMEN 1 VIEW: CPT

## 2022-05-31 PROCEDURE — 81002 URINALYSIS NONAUTO W/O SCOPE: CPT | Performed by: NURSE PRACTITIONER

## 2022-05-31 RX ORDER — NITROFURANTOIN 25; 75 MG/1; MG/1
100 CAPSULE ORAL EVERY 12 HOURS SCHEDULED
Qty: 14 CAPSULE | Refills: 0 | Status: SHIPPED | OUTPATIENT
Start: 2022-05-31 | End: 2022-06-07

## 2022-05-31 NOTE — PROGRESS NOTES
"Chief Complaint   Patient presents with   • Blood in Urine       Urinary Tract Infection: Patient complains of hematuria She has had symptoms for 2 weeks. Patient also complains of none. Patient denies back pain and fever. Patient does not have a history of recurrent UTI.  Patient does not have a history of pyelonephritis.     The following portions of the patient's history were reviewed and updated as appropriate: allergies, current medications, past family history, past medical history, past social history, past surgical history and problem list.      Vitals:    05/31/22 0743   BP: 120/72   BP Location: Left arm   Patient Position: Sitting   Cuff Size: Adult   Pulse: 102   Resp: 16   Temp: 98.6 °F (37 °C)   SpO2: 100%   Weight: 45.8 kg (101 lb)   Height: 154.9 cm (61\")     Gen: Well appearing, alert  HEENT: WNL  Lung: Regular RR, no audible wheeze  Heart: RR without murmur  Skin: No rash, W/D  Abd: Non tender, non distended, positive bowel sounds  Flank: No CVA, no rash    In office urine dipstick results:  Brief Urine Lab Results  (Last result in the past 365 days)      Color   Clarity   Blood   Leuk Est   Nitrite   Protein   CREAT   Urine HCG        08/19/21 1453 Yellow   Hazy   Large   Negative   Negative   Negative                 Assessment & Plan   Diagnoses and all orders for this visit:    1. Hematuria, unspecified type (Primary)  -     POC Urinalysis Dipstick  -     nitrofurantoin, macrocrystal-monohydrate, (Macrobid) 100 MG capsule; Take 1 capsule by mouth Every 12 (Twelve) Hours for 7 days.  Dispense: 14 capsule; Refill: 0  -     XR Abdomen KUB  -     Urine Culture - Urine, Urine, Clean Catch           Tylenol or Advil as needed for pain, fever  Plenty of fluids  OTC Azo ok  Off work or school note given if needed.  Pros and cons of antibiotic use discussed      Patient was wearing face mask when I entered the room and throughout our encounter. Protective equipment was worn throughout this patient " encounter including a face mask, eye protection, and gloves. Hand hygiene was performed before donning protective equipment and after removal when leaving the room.     PEGGY Choudhury  Family Practice  [facility] [place]

## 2022-06-01 ENCOUNTER — TELEPHONE (OUTPATIENT)
Dept: FAMILY MEDICINE CLINIC | Facility: CLINIC | Age: 67
End: 2022-06-01

## 2022-06-01 NOTE — TELEPHONE ENCOUNTER
Caller: Danii Emanuel    Relationship: Self    Best call back number:   [phone]    What test was performed: URINE CULTURE AND X RAY     When was the test performed: 5/31/2022    Where was the test performed: OFFICE

## 2022-06-02 DIAGNOSIS — R31.0 GROSS HEMATURIA: Primary | ICD-10-CM

## 2022-06-02 LAB
BACTERIA UR CULT: NO GROWTH
BACTERIA UR CULT: NORMAL

## 2022-06-07 NOTE — TELEPHONE ENCOUNTER
UNABLE TO WARM TRANSFER    PATIENT IS STILL EXPERIENCING BLOOD IN URINE AND WANTS TO CHECK STATUS OF REFERRAL. PLEASE CALL AND ADVISE. PATIENT IS VERY CONCERNED.